# Patient Record
Sex: FEMALE | Race: WHITE | NOT HISPANIC OR LATINO | Employment: FULL TIME | URBAN - METROPOLITAN AREA
[De-identification: names, ages, dates, MRNs, and addresses within clinical notes are randomized per-mention and may not be internally consistent; named-entity substitution may affect disease eponyms.]

---
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
## 2023-02-02 ENCOUNTER — OFFICE VISIT (OUTPATIENT)
Dept: URGENT CARE | Facility: CLINIC | Age: 18
End: 2023-02-02
Payer: COMMERCIAL

## 2023-02-02 VITALS
HEIGHT: 59 IN | OXYGEN SATURATION: 100 % | RESPIRATION RATE: 18 BRPM | TEMPERATURE: 99 F | HEART RATE: 82 BPM | DIASTOLIC BLOOD PRESSURE: 70 MMHG | BODY MASS INDEX: 24.8 KG/M2 | SYSTOLIC BLOOD PRESSURE: 114 MMHG | WEIGHT: 123 LBS

## 2023-02-02 DIAGNOSIS — L03.119 CELLULITIS OF KNEE: ICD-10-CM

## 2023-02-02 DIAGNOSIS — S80.01XA CONTUSION OF RIGHT KNEE, INITIAL ENCOUNTER: ICD-10-CM

## 2023-02-02 DIAGNOSIS — S89.91XA RIGHT KNEE INJURY, INITIAL ENCOUNTER: Primary | ICD-10-CM

## 2023-02-02 DIAGNOSIS — S80.211A ABRASION OF RIGHT KNEE, INITIAL ENCOUNTER: ICD-10-CM

## 2023-02-02 PROCEDURE — 99214 OFFICE O/P EST MOD 30 MIN: CPT | Mod: ,,, | Performed by: FAMILY MEDICINE

## 2023-02-02 PROCEDURE — 3008F PR BODY MASS INDEX (BMI) DOCUMENTED: ICD-10-PCS | Mod: CPTII,,, | Performed by: FAMILY MEDICINE

## 2023-02-02 PROCEDURE — 3008F BODY MASS INDEX DOCD: CPT | Mod: CPTII,,, | Performed by: FAMILY MEDICINE

## 2023-02-02 PROCEDURE — 1160F PR REVIEW ALL MEDS BY PRESCRIBER/CLIN PHARMACIST DOCUMENTED: ICD-10-PCS | Mod: CPTII,,, | Performed by: FAMILY MEDICINE

## 2023-02-02 PROCEDURE — 3078F DIAST BP <80 MM HG: CPT | Mod: CPTII,,, | Performed by: FAMILY MEDICINE

## 2023-02-02 PROCEDURE — 1159F MED LIST DOCD IN RCRD: CPT | Mod: CPTII,,, | Performed by: FAMILY MEDICINE

## 2023-02-02 PROCEDURE — 99214 PR OFFICE/OUTPT VISIT, EST, LEVL IV, 30-39 MIN: ICD-10-PCS | Mod: ,,, | Performed by: FAMILY MEDICINE

## 2023-02-02 PROCEDURE — 1160F RVW MEDS BY RX/DR IN RCRD: CPT | Mod: CPTII,,, | Performed by: FAMILY MEDICINE

## 2023-02-02 PROCEDURE — 1159F PR MEDICATION LIST DOCUMENTED IN MEDICAL RECORD: ICD-10-PCS | Mod: CPTII,,, | Performed by: FAMILY MEDICINE

## 2023-02-02 PROCEDURE — 3074F PR MOST RECENT SYSTOLIC BLOOD PRESSURE < 130 MM HG: ICD-10-PCS | Mod: CPTII,,, | Performed by: FAMILY MEDICINE

## 2023-02-02 PROCEDURE — 3078F PR MOST RECENT DIASTOLIC BLOOD PRESSURE < 80 MM HG: ICD-10-PCS | Mod: CPTII,,, | Performed by: FAMILY MEDICINE

## 2023-02-02 PROCEDURE — 3074F SYST BP LT 130 MM HG: CPT | Mod: CPTII,,, | Performed by: FAMILY MEDICINE

## 2023-02-02 RX ORDER — CEPHALEXIN 500 MG/1
500 CAPSULE ORAL EVERY 8 HOURS
Qty: 15 CAPSULE | Refills: 0 | Status: SHIPPED | OUTPATIENT
Start: 2023-02-02 | End: 2023-02-07

## 2023-02-02 RX ORDER — IBUPROFEN 800 MG/1
800 TABLET ORAL 3 TIMES DAILY PRN
Qty: 30 TABLET | Refills: 0 | Status: SHIPPED | OUTPATIENT
Start: 2023-02-02 | End: 2023-02-12

## 2023-02-02 RX ORDER — TRAMADOL HYDROCHLORIDE 50 MG/1
50 TABLET ORAL EVERY 6 HOURS PRN
Qty: 12 TABLET | Refills: 0 | Status: SHIPPED | OUTPATIENT
Start: 2023-02-02 | End: 2023-02-05

## 2023-02-02 NOTE — PROGRESS NOTES
"Subjective:       Patient ID: Christofer Woodall is a 17 y.o. female.    Vitals:  height is 4' 11" (1.499 m) and weight is 55.8 kg (123 lb). Her temperature is 99 °F (37.2 °C). Her blood pressure is 114/70 and her pulse is 82. Her respiration is 18 and oxygen saturation is 100%.     Chief Complaint: Knee Injury    17-year-old female presents to clinic complaining of right knee pain.  Patient states she fell onto concrete and brick with her right knee.  There is a large abrasion on the knee.  Patient states there is pain with weight-bearing and ambulation.  Patient states she is able to extend the leg out but this causes her leg to shake.  Denies any fever.      Constitution: Negative.   HENT: Negative.     Cardiovascular: Negative.    Eyes: Negative.    Respiratory: Negative.     Gastrointestinal: Negative.    Genitourinary: Negative.    Musculoskeletal:  Positive for joint pain.   Skin: Negative.  Positive for erythema (there is a 3 x 3 area of eschar and crust over the right patella.  With surrounding erythema and warmth.  Tender to palpation.  Has full range of motion).   Allergic/Immunologic: Negative.    Neurological: Negative.    Hematologic/Lymphatic: Negative.      Objective:      Physical Exam   Constitutional: She is oriented to person, place, and time.  Non-toxic appearance. She does not appear ill. No distress.   HENT:   Head: Normocephalic and atraumatic.   Eyes: Conjunctivae are normal.   Pulmonary/Chest: Effort normal.   Abdominal: Normal appearance.   Neurological: She is alert and oriented to person, place, and time.   Skin: erythema (there is a 3 x 3 area of eschar and crust over the right patella.  With surrounding erythema and warmth.  Tender to palpation.  Has full range of motion)   Psychiatric: Her behavior is normal. Mood, judgment and thought content normal.   Vitals reviewed.         Previous History      Review of patient's allergies indicates:   Allergen Reactions    Adhesive Rash " "      Past Medical History:   Diagnosis Date    Anxiety     Bipolar disorder     Depression      Current Outpatient Medications   Medication Instructions    ARIPiprazole (ABILIFY) 5 mg, Oral    cephALEXin (KEFLEX) 500 mg, Oral, Every 8 hours    hydrOXYzine pamoate (VISTARIL) 25 mg, Oral, 4 times daily    ibuprofen (ADVIL,MOTRIN) 800 mg, Oral, 3 times daily PRN    traMADoL (ULTRAM) 50 mg, Oral, Every 6 hours PRN    traZODone (DESYREL) 100 mg, Oral, Nightly     History reviewed. No pertinent surgical history.  History reviewed. No pertinent family history.    Social History     Tobacco Use    Smoking status: Every Day    Smokeless tobacco: Never   Substance Use Topics    Alcohol use: Yes        Physical Exam      Vital Signs Reviewed   /70   Pulse 82   Temp 99 °F (37.2 °C)   Resp 18   Ht 4' 11" (1.499 m)   Wt 55.8 kg (123 lb)   SpO2 100%   BMI 24.84 kg/m²        Procedures    Procedures     Labs     Results for orders placed or performed in visit on 12/31/22   POCT COVID-19 Rapid Screening   Result Value Ref Range    POC Rapid COVID Negative Negative     Acceptable Yes    POCT Influenza A/B MOLECULAR   Result Value Ref Range    POC Molecular Influenza A Ag Negative Negative, Not Reported    POC Molecular Influenza B Ag Negative Negative, Not Reported     Acceptable Yes    POCT Strep A, Molecular   Result Value Ref Range    Molecular Strep A, POC Negative Negative     Acceptable Yes        Assessment:       1. Right knee injury, initial encounter    2. Contusion of right knee, initial encounter    3. Abrasion of right knee, initial encounter    4. Cellulitis of knee            Plan:     X-rays negative for fracture  Medications sent to pharmacy  It medication may cause drowsiness.  Do not drink alcohol or drive when taking it  Wash area with antibacterial soap daily  As discussed, monitor for signs of worsening infection.  This would include increasing pain, " more swelling of the knee, more redness around the knee, pus drainage from the wound, or fever.  Seek medical attention immediately if any of these happen       Right knee injury, initial encounter  -     XR KNEE 3 VIEW RIGHT; Future; Expected date: 02/02/2023  -     X-Ray Knee 1 or 2 View Right; Future; Expected date: 02/02/2023    Contusion of right knee, initial encounter    Abrasion of right knee, initial encounter    Cellulitis of knee    Other orders  -     cephALEXin (KEFLEX) 500 MG capsule; Take 1 capsule (500 mg total) by mouth every 8 (eight) hours. for 5 days  Dispense: 15 capsule; Refill: 0  -     traMADoL (ULTRAM) 50 mg tablet; Take 1 tablet (50 mg total) by mouth every 6 (six) hours as needed for Pain.  Dispense: 12 tablet; Refill: 0  -     ibuprofen (ADVIL,MOTRIN) 800 MG tablet; Take 1 tablet (800 mg total) by mouth 3 (three) times daily as needed for Pain.  Dispense: 30 tablet; Refill: 0

## 2023-02-02 NOTE — PROGRESS NOTES
Subjective:       Patient ID: Christofer Woodall is a 17 y.o. female.    Vitals:  vitals were not taken for this visit.     Chief Complaint: Knee Injury    Right Knee injury x6d fell off scooter     Knee Injury  ROS    Objective:      Physical Exam      Assessment:       No diagnosis found.      Plan:         There are no diagnoses linked to this encounter.

## 2023-02-03 ENCOUNTER — OFFICE VISIT (OUTPATIENT)
Dept: URGENT CARE | Facility: CLINIC | Age: 18
End: 2023-02-03
Payer: COMMERCIAL

## 2023-02-03 VITALS
SYSTOLIC BLOOD PRESSURE: 119 MMHG | WEIGHT: 123 LBS | DIASTOLIC BLOOD PRESSURE: 69 MMHG | HEIGHT: 59 IN | RESPIRATION RATE: 18 BRPM | OXYGEN SATURATION: 100 % | TEMPERATURE: 99 F | BODY MASS INDEX: 24.8 KG/M2 | HEART RATE: 74 BPM

## 2023-02-03 DIAGNOSIS — S80.211A ABRASION OF RIGHT KNEE, INITIAL ENCOUNTER: ICD-10-CM

## 2023-02-03 DIAGNOSIS — L03.119 CELLULITIS OF KNEE: Primary | ICD-10-CM

## 2023-02-03 PROCEDURE — 1159F MED LIST DOCD IN RCRD: CPT | Mod: CPTII,,,

## 2023-02-03 PROCEDURE — 99212 PR OFFICE/OUTPT VISIT, EST, LEVL II, 10-19 MIN: ICD-10-PCS | Mod: ,,,

## 2023-02-03 PROCEDURE — 3074F PR MOST RECENT SYSTOLIC BLOOD PRESSURE < 130 MM HG: ICD-10-PCS | Mod: CPTII,,,

## 2023-02-03 PROCEDURE — 3074F SYST BP LT 130 MM HG: CPT | Mod: CPTII,,,

## 2023-02-03 PROCEDURE — 3008F PR BODY MASS INDEX (BMI) DOCUMENTED: ICD-10-PCS | Mod: CPTII,,,

## 2023-02-03 PROCEDURE — 1160F PR REVIEW ALL MEDS BY PRESCRIBER/CLIN PHARMACIST DOCUMENTED: ICD-10-PCS | Mod: CPTII,,,

## 2023-02-03 PROCEDURE — 3078F PR MOST RECENT DIASTOLIC BLOOD PRESSURE < 80 MM HG: ICD-10-PCS | Mod: CPTII,,,

## 2023-02-03 PROCEDURE — 1159F PR MEDICATION LIST DOCUMENTED IN MEDICAL RECORD: ICD-10-PCS | Mod: CPTII,,,

## 2023-02-03 PROCEDURE — 3008F BODY MASS INDEX DOCD: CPT | Mod: CPTII,,,

## 2023-02-03 PROCEDURE — 3078F DIAST BP <80 MM HG: CPT | Mod: CPTII,,,

## 2023-02-03 PROCEDURE — 1160F RVW MEDS BY RX/DR IN RCRD: CPT | Mod: CPTII,,,

## 2023-02-03 PROCEDURE — 99212 OFFICE O/P EST SF 10 MIN: CPT | Mod: ,,,

## 2023-02-03 NOTE — PATIENT INSTRUCTIONS
X-rays negative for fracture  Medications sent to pharmacy  It medication may cause drowsiness.  Do not drink alcohol or drive when taking it  Wash area with antibacterial soap daily  As discussed, monitor for signs of worsening infection.  This would include increasing pain, more swelling of the knee, more redness around the knee, pus drainage from the wound, or fever.  Seek medical attention immediately if any of these happen.

## 2023-02-03 NOTE — PROGRESS NOTES
"Subjective:       Patient ID: Christofer Woodall is a 17 y.o. female.    Vitals:  height is 4' 11" (1.499 m) and weight is 55.8 kg (123 lb). Her temperature is 98.5 °F (36.9 °C). Her blood pressure is 119/69 and her pulse is 74. Her respiration is 18 and oxygen saturation is 100%.     Chief Complaint: Wound Check    Patient is a 17-year-old female with history of bipolar, anxiety, depression who presents to clinic complaining of drainage and pain to the wound on her right knee.  Patient was seen in clinic by Dr. Lees.  Discharge paperwork stated that if wound was leaking she was to seek medical attention.  Patient wanted to be evaluated.  Patient denies fever, body aches, chills.  She took 1 dose of Keflex last night.  She also reports having mupirocin ointment.    Wound Check    Musculoskeletal:  Positive for pain.   Skin:  Positive for wound, abrasion and erythema.     Objective:      Physical Exam   Constitutional: She is oriented to person, place, and time. She appears well-developed.   HENT:   Head: Normocephalic and atraumatic. Head is without abrasion, without contusion and without laceration.   Ears:   Right Ear: External ear normal.   Left Ear: External ear normal.   Nose: Nose normal.   Mouth/Throat: Oropharynx is clear and moist and mucous membranes are normal.   Eyes: Conjunctivae, EOM and lids are normal.   Neck: Trachea normal and phonation normal. Neck supple.   Cardiovascular: Normal rate, regular rhythm and normal heart sounds.   Pulmonary/Chest: Effort normal and breath sounds normal. No stridor. No respiratory distress.   Musculoskeletal: Normal range of motion.         General: Normal range of motion.   Neurological: She is alert and oriented to person, place, and time.   Skin: Skin is warm, dry and no rash. Capillary refill takes less than 2 seconds. Abrasions - lower ext.:  knee (right)abrasion and erythema No burn, No bruising and No ecchymosis   Psychiatric: Her speech is normal and behavior " is normal. Judgment and thought content normal.   Nursing note and vitals reviewed.    Integumentary:  As needed yesterday, there is a 3 x 3 cm abrasion to the right knee with erythema, eschar, some epithelialization present, small amount of serosanguineous drainage.  Tender to touch.  No purulence noted, no streaking.   Assessment:       1. Cellulitis of knee    2. Abrasion of right knee, initial encounter          Plan:         Cellulitis of knee    Abrasion of right knee, initial encounter       Photograph of abrasion, cellulitis and wound to right knee uploaded via [employer] under media tab.     Continue Keflex, ensure you are taking every 8 hours.  Take until complete even if symptoms improve.  Ensure your using mupirocin ointment 3 times daily.  Continue prescribed medication for pain at visit yesterday as directed.  Antibiotics will take 2 days to begin to kick in  If you notice large amounts of purulent drainage, streaking of redness up or down your leg, you will need to be further evaluated at that time.       Follow up with your Primary Care Provider within 3-5 days for a recheck.   Present to the Emergency Department for any significant change or worsening symptoms including worsening redness, swelling, purulent discharge, fever, body aches, or chills.

## 2023-02-03 NOTE — PATIENT INSTRUCTIONS
Continue Keflex, ensure you are taking every 8 hours.  Take until complete even if symptoms improve.  Ensure your using mupirocin ointment 3 times daily.  Continue prescribed medication for pain at visit yesterday as directed.  Antibiotics will take 2 days to begin to kick in  If you notice large amounts of purulent drainage, streaking of redness up or down your leg, you will need to be further evaluated at that time.       Follow up with your Primary Care Provider within 3-5 days for a recheck.   Present to the Emergency Department for any significant change or worsening symptoms including worsening redness, swelling, purulent discharge, fever, body aches, or chills.

## 2024-10-09 DIAGNOSIS — O09.899 TWO VESSEL UMBILICAL CORD IN SINGLETON PREGNANCY, ANTEPARTUM: Primary | ICD-10-CM

## 2024-10-10 ENCOUNTER — TELEPHONE (OUTPATIENT)
Dept: MATERNAL FETAL MEDICINE | Facility: CLINIC | Age: 19
End: 2024-10-10
Payer: COMMERCIAL

## 2024-10-14 DIAGNOSIS — O09.899 TWO VESSEL UMBILICAL CORD IN SINGLETON PREGNANCY, ANTEPARTUM: Primary | ICD-10-CM

## 2024-10-16 ENCOUNTER — TELEPHONE (OUTPATIENT)
Dept: MATERNAL FETAL MEDICINE | Facility: CLINIC | Age: 19
End: 2024-10-16
Payer: COMMERCIAL

## 2024-10-16 PROBLEM — O28.3 ABNORMAL PRENATAL ULTRASOUND: Status: ACTIVE | Noted: 2024-10-16

## 2024-10-16 NOTE — PROGRESS NOTES
Maternal Fetal Medicine New Consult    Subjective:     Patient ID: [id number]    Chief Complaint: mfm consult w/us      HPI: 19 y.o.  female  at 25w1d gestation with Estimated Date of Delivery:  2025. by LMP, consistent with early US. She is sent for MFM consultation for two-vessel umbilical cord on outside scan.     Patient had suspected two-vessel umbilical cord on outside ultrasound.  She used marijuana early in pregnancy, reports quit.  She has history of depression/anxiety, bipolar.  She was previously on Abilify.  She however reports that she does not think that it was really bipolar and mostly situational.  She has not been on any medication in about 2 years and is feeling well at this time, follows with Margot Kay routinely.  Denies any SI/HI.  She has elevated BMI of 33.33 on initial MFM consult visit.  Patient was accompanied by her significant other, Charly       She denies any personal or family history of aneuploidy or anomalies. She denies any exposure to high fevers, viral rashes, or alcohol in this pregnancy.  She denies any leaking fluid, vaginal bleeding, contractions, decreased fetal movement. Denies headaches, visual disturbances, or epigastric pain.       Pregnancy complications include:  Patient Active Problem List   Diagnosis    Abnormal prenatal ultrasound    History of depression    Elevated BMI affecting pregnancy in second trimester    Previous marijuana use    Two vessel umbilical cord in horton pregnancy, antepartum       Past Medical History:   Diagnosis Date    Anxiety     not currently    Bipolar disorder     currently    Depression 2018    Not currently    Generalized anxiety disorder 10/14/2022    Major depressive disorder, recurrent, severe without psychotic features 10/14/2022       Past Surgical History:   Procedure Laterality Date    LITHOTRIPSY      TONSILLECTOMY, ADENOIDECTOMY  2007       Family History   Problem Relation Name Age of Onset     "Breast cancer Paternal Grandmother      Miscarriages / Stillbirths Mother      Hypertension Mother      Eclampsia Mother         Social History     Socioeconomic History    Marital status: Single   Tobacco Use    Smoking status: Former     Types: Vaping with nicotine     Start date:      Quit date:      Years since quittin.7     Passive exposure: Current    Smokeless tobacco: Never   Substance and Sexual Activity    Alcohol use: Never    Drug use: Not Currently     Types: Marijuana    Sexual activity: Yes     Partners: Male       Current Outpatient Medications   Medication Sig Dispense Refill    PRENATAL VITAMIN PLUS LOW IRON 27 mg iron- 1 mg Tab Take 1 tablet by mouth.      aspirin (ECOTRIN) 81 MG EC tablet Take 1 tablet (81 mg total) by mouth once daily. 30 tablet 5    hydrOXYzine pamoate (VISTARIL) 25 MG Cap Take 25 mg by mouth 4 (four) times daily. (Patient not taking: Reported on 10/17/2024)      ondansetron (ZOFRAN) 4 MG tablet Take 4 mg by mouth every 6 (six) hours as needed. (Patient not taking: Reported on 10/17/2024)       No current facility-administered medications for this visit.       Review of patient's allergies indicates:   Allergen Reactions    Adhesive Rash        Review of Systems   12 point review of systems conducted, negative except as stated in the history of present illness. See HPI for details.      Objective:     Visit Vitals  /76 (BP Location: Left arm, Patient Position: Sitting)   Pulse 85   Ht 4' 11" (1.499 m)   Wt 74.8 kg (165 lb)   LMP 2024 (Exact Date)   BMI 33.33 kg/m²        Physical Exam  Vitals and nursing note reviewed.   Constitutional:       General: She is not in acute distress.     Appearance: Normal appearance.      Comments: Increased BMI   HENT:      Head: Normocephalic and atraumatic.      Nose: Nose normal. No congestion.      Mouth/Throat:      Pharynx: Oropharynx is clear.   Eyes:      General: No scleral icterus.     Conjunctiva/sclera: " Conjunctivae normal.   Cardiovascular:      Rate and Rhythm: Normal rate and regular rhythm.   Pulmonary:      Effort: No respiratory distress.      Breath sounds: Normal breath sounds. No wheezing.   Abdominal:      General: Abdomen is flat.      Palpations: Abdomen is soft.      Tenderness: There is no abdominal tenderness. There is no right CVA tenderness, left CVA tenderness or guarding.      Comments: No CVA tenderness gravid uterus.    Musculoskeletal:         General: Normal range of motion.      Cervical back: Neck supple.      Right lower leg: No edema.      Left lower leg: No edema.   Skin:     General: Skin is warm.      Findings: No bruising or rash.   Neurological:      General: No focal deficit present.      Mental Status: She is oriented to person, place, and time.      Deep Tendon Reflexes: Reflexes normal.      Comments: Normal reflexes   Psychiatric:         Mood and Affect: Mood normal.         Behavior: Behavior normal.         Thought Content: Thought content normal.         Judgment: Judgment normal.         Assessment/Plan:     19 y.o.  female with IUP at 25w1d    Two-vessel umbilical cord   There is normal fetal growth seen on 10/17/24.  Two-vessel umbilical cord seen on anatomy scan 10/17/2024.    The prevalence of two vessel cord is around 0.5-1%, with numerous maternal and fetal risk factors including multiple births, older maternal age, smoking, maternal diabetes, hypertensive disease, epilepsy, and drug exposure like vitamin A, Phenytoin and Levothyroxine, or substance abuse. I have discussed with her that two vessel cord is associated with high risk of anomalies of around 20% (up to 35%).  The most common congenital anomalies associated include genitourinary (6.5%), cardiovascular (6.2%), and musculoskeletal (5.4%). The reassurance obtained by the normal ultrasound and the absence of other anomalies were discussed, as well as the limitation of ultrasound in checking for anomalies  and the need for  evaluation especially if congenital heart disease.    With isolated single umbilical artery, there is no increased risk of aneuploidy, but there is increase risk of placental abnormalities (OR 3.63), hydramnios (OR 2.8), higher risk of prematurity, fetal growth restriction and adverse  outcomes. The risk of aneuploidy is increased with presence of other anomalies - approximate risk is 3.7% with one additional anomaly and up to 50% with multiple anomalies. However, I discussed and offered diagnostic genetic amniocentesis. She declined amniocentesis .    I discussed and counseled on Cell free DNA understanding that it is an enhanced screening test but not a diagnostic test. It assesses risk for common aneuploidies such as Trisomy 13, 18, and 21 by evaluating cell-free fetal DNA in maternal circulation with a false positive rate less than 0.5% for Trisomy 21 and less reliable for Trisomy 13 and Trisomy 18. She accepted cell free DNA and order was given.    With two-vessel cord, there is higher risk of fetal growth restriction. She would benefit from serial ultrasounds to monitor growth with ultrasound around every 4 weeks until end of pregnancy.  Recommend weekly fetal testing starting at 32 weeks. She was advised to continue fetal kick counts 3 times daily and PRN, with immediate reporting of decreased fetal movements (<10 movements/hr).      Previous marijuana use   I discussed with her the likely cognitive developmental effect of those drugs that would not be visible by the ultrasound and the importance of long term  follow up.  Marijuana use in pregnancy is associated with long term neurobehavioral adverse outcomes, as well as fetal growth restriction, stillbirth, spontaneous  birth, and  intensive care unit admission. .  I stressed the importance of complete abstinence from drug use in pregnancy. I would recommend doing intermittent drug screens to help  with compliance, with risks and benefits of drug testing discussed.      History of depression/anxiety  Discussed risks with depression and antidepressant use in pregnancy. I have shared with her the five-fold increased risk of recurrence of depression with discontinuing the medication in pregnancy. It was weighed against the risk of medication use including potential birth defects of some anti depression medications, as well as the likelihood of SSRI medications causing pulmonary hypertension when used after 20 weeks as well as  morbidity when used close to delivery (in 30% of patients). Although earlier limited data suggested association of paroxetine (Paxil) with right ventricular outflow tract obstruction and sertraline (Zoloft) with ventricular septal heart defects, a recent (2014) large population based study showed no substantial increase in the risk of cardiac malformations attributable to antidepressant use in 1st trimester. The absolute risk of other reported risks in some studies is very small: omphalocele of 1 in 5,000 births, craniosynostosis 1 in 1,800 births, and anencephaly of 1 in 1,000 births.    The potential risks of SSRI use in pregnancy must be considered in the context of the risk of relapse of depression if treatment is discontinued. In a study of pregnant women taking antidepressants before conception, a 68% relapse of depression was documented in those who discontinued medications during pregnancy compared with 25% relapse in those who continued antidepressant medications. Factors associated with relapse during pregnancy include a history of more than 5 years of depressive illness and of more than four episodes of relapse.    With no symptoms at this time, it was agreed to stay off the medication at this time with patient to report symptoms of depression recurrence. If she experiences any SI/HI tendencies, she is to report it immediately to provider/ER for prompt intervention. She  was advised to continue follow up care with her Mental Health provider.    After delivery, she should have a follow-up appointment within 1-2 weeks with both her psychiatric provider as well as an early postpartum visit for a mood check.  It would be reasonable to have a discussion about the benefits of breast feeding versus the potential risks of medication exposure in the breast milk.       Preeclampsia prophylaxis  With her risk factors for preeclampsia including  nulliparity, BMI over 30, mother with preeclampsia, and low socioeconomic status, discussed recommendations for low dose aspirin use to decrease risks for adverse outcomes, including preeclampsia, low birth weight and  delivery. Low-dose aspirin reduced the risk for preeclampsia by 15% in clinical trials and reduced the risk for  birth by 20% and FGR by 20%, and  mortality by around 20%.  After discussing risks/benefits of its use, it was agreed to start asa 81 mg daily today and continue until delivery..  Preeclampsia precautions given.    Patient was counseled on significance of two-vessel cord, association with other anomalies and increased  morbidity and potential mortality risk.  Patient was offered noninvasive invasive testing agreed to do cell free DNA.  She declined to have an amniocentesis.  We will plan to see in 4 weeks to complete anatomy scan.  Risks with increased BMI reviewed recommended healthy diet and proper weight gain recommendations reviewed.  Start patient on daily aspirin.  Advised to avoid any marijuana.  She was advised to keep her follow-up with a mental health provider    Follow up in about 4 weeks (around 2024) for MFM Followup, Repeat Ultrasound.     Future Appointments   Date Time Provider Department Center   [facility]  [facility]   2024  3:30 PM [facility]          Patient was evaluated by Marisela Flowers,  DAY, and and Dr. Kapoor.  Final assessment and recommendations as stated above were made by Dr. Kapoor.    This note was created with the assistance of [employer] voice recognition software. There may be transcription errors as a result of using this technology, however minimal. Effort has been made to ensure accuracy of transcription, but any obvious errors or omissions should be clarified with the author of the document.

## 2024-10-17 ENCOUNTER — OFFICE VISIT (OUTPATIENT)
Dept: MATERNAL FETAL MEDICINE | Facility: CLINIC | Age: 19
End: 2024-10-17
Payer: COMMERCIAL

## 2024-10-17 ENCOUNTER — PROCEDURE VISIT (OUTPATIENT)
Dept: MATERNAL FETAL MEDICINE | Facility: CLINIC | Age: 19
End: 2024-10-17
Payer: COMMERCIAL

## 2024-10-17 VITALS
WEIGHT: 165 LBS | HEIGHT: 59 IN | BODY MASS INDEX: 33.26 KG/M2 | SYSTOLIC BLOOD PRESSURE: 106 MMHG | DIASTOLIC BLOOD PRESSURE: 76 MMHG | HEART RATE: 85 BPM

## 2024-10-17 DIAGNOSIS — O28.3 ABNORMAL PRENATAL ULTRASOUND: Primary | ICD-10-CM

## 2024-10-17 DIAGNOSIS — Z86.59 HISTORY OF DEPRESSION: ICD-10-CM

## 2024-10-17 DIAGNOSIS — O99.212 OBESITY AFFECTING PREGNANCY IN SECOND TRIMESTER, UNSPECIFIED OBESITY TYPE: ICD-10-CM

## 2024-10-17 DIAGNOSIS — O09.899 TWO VESSEL UMBILICAL CORD IN SINGLETON PREGNANCY, ANTEPARTUM: ICD-10-CM

## 2024-10-17 DIAGNOSIS — O99.322 DRUG USE AFFECTING PREGNANCY IN SECOND TRIMESTER: ICD-10-CM

## 2024-10-17 PROBLEM — F41.1 GENERALIZED ANXIETY DISORDER: Status: RESOLVED | Noted: 2022-10-14 | Resolved: 2024-10-17

## 2024-10-17 PROBLEM — F33.2 MAJOR DEPRESSIVE DISORDER, RECURRENT, SEVERE WITHOUT PSYCHOTIC FEATURES: Status: RESOLVED | Noted: 2022-10-14 | Resolved: 2024-10-17

## 2024-10-17 RX ORDER — ONDANSETRON 4 MG/1
4 TABLET, FILM COATED ORAL EVERY 6 HOURS PRN
COMMUNITY
Start: 2024-09-03

## 2024-10-17 RX ORDER — ASPIRIN 81 MG/1
81 TABLET ORAL DAILY
Qty: 30 TABLET | Refills: 5 | Status: SHIPPED | OUTPATIENT
Start: 2024-10-17 | End: 2025-04-15

## 2024-10-17 RX ORDER — PRENATAL WITH FERROUS FUM AND FOLIC ACID 3080; 920; 120; 400; 22; 1.84; 3; 20; 10; 1; 12; 200; 27; 25; 2 [IU]/1; [IU]/1; MG/1; [IU]/1; MG/1; MG/1; MG/1; MG/1; MG/1; MG/1; UG/1; MG/1; MG/1; MG/1; MG/1
1 TABLET ORAL
COMMUNITY
Start: 2024-05-31

## 2024-11-04 DIAGNOSIS — O09.899 TWO VESSEL UMBILICAL CORD IN SINGLETON PREGNANCY, ANTEPARTUM: Primary | ICD-10-CM

## 2024-11-04 DIAGNOSIS — O99.322 DRUG USE AFFECTING PREGNANCY IN SECOND TRIMESTER: ICD-10-CM

## 2024-11-14 ENCOUNTER — PROCEDURE VISIT (OUTPATIENT)
Dept: MATERNAL FETAL MEDICINE | Facility: CLINIC | Age: 19
End: 2024-11-14
Payer: COMMERCIAL

## 2024-11-14 ENCOUNTER — OFFICE VISIT (OUTPATIENT)
Dept: MATERNAL FETAL MEDICINE | Facility: CLINIC | Age: 19
End: 2024-11-14
Payer: COMMERCIAL

## 2024-11-14 VITALS
HEART RATE: 95 BPM | BODY MASS INDEX: 35.08 KG/M2 | HEIGHT: 59 IN | SYSTOLIC BLOOD PRESSURE: 111 MMHG | DIASTOLIC BLOOD PRESSURE: 86 MMHG | WEIGHT: 174 LBS

## 2024-11-14 DIAGNOSIS — Z86.59 HISTORY OF DEPRESSION: ICD-10-CM

## 2024-11-14 DIAGNOSIS — O99.213 OBESITY AFFECTING PREGNANCY IN THIRD TRIMESTER, UNSPECIFIED OBESITY TYPE: ICD-10-CM

## 2024-11-14 DIAGNOSIS — O26.03 EXCESSIVE WEIGHT GAIN IN PREGNANCY IN THIRD TRIMESTER: ICD-10-CM

## 2024-11-14 DIAGNOSIS — O28.3 ABNORMAL PRENATAL ULTRASOUND: ICD-10-CM

## 2024-11-14 DIAGNOSIS — O09.899 TWO VESSEL UMBILICAL CORD IN SINGLETON PREGNANCY, ANTEPARTUM: ICD-10-CM

## 2024-11-14 DIAGNOSIS — O99.322 DRUG USE AFFECTING PREGNANCY IN SECOND TRIMESTER: Primary | ICD-10-CM

## 2024-11-14 DIAGNOSIS — O99.322 DRUG USE AFFECTING PREGNANCY IN SECOND TRIMESTER: ICD-10-CM

## 2024-11-14 DIAGNOSIS — O36.5990 PREGNANCY AFFECTED BY FETAL GROWTH RESTRICTION: ICD-10-CM

## 2024-11-14 NOTE — PROGRESS NOTES
Maternal Fetal Medicine Follow Up    Subjective:     Patient ID: [id number]    Chief Complaint: [facility] follow up with US      HPI: Christofer Woodall is a 19 y.o. female  at 29w1d gestation with Estimated Date of Delivery: 25  who is here for follow-up consultation by MFM.    She has two-vessel umbilical cord.  She declined amniocentesis and accepted cell free DNA, but has not done that yet.  She used marijuana early in pregnancy, reports quit.  She has history of depression/anxiety, bipolar; but reportedly patient feels was mostly situational and has been feeling well on no medications, following with Margot Kay routinely.  Denies any SI/HI.  She had elevated BMI of 33.33 on initial MFM consult visit.  She is on low-dose aspirin once daily for preeclampsia prophylaxis.       Interval history since last MFM visit: None.. She denies any leaking fluid, vaginal bleeding, contractions, decreased fetal movement. Denies headaches, visual disturbances, or epigastric pain.    Pregnancy complications include:   Patient Active Problem List   Diagnosis    Abnormal prenatal ultrasound    History of depression    Elevated BMI affecting pregnancy in third trimester    Previous marijuana use    Two vessel umbilical cord in horton pregnancy, antepartum    Pregnancy affected by fetal growth restriction    Excessive weight gain in pregnancy in third trimester       No changes to medical, surgical, family, social, or obstetric history.    Medications:  Current Outpatient Medications   Medication Instructions    aspirin (ECOTRIN) 81 mg, Oral, Daily    hydrOXYzine pamoate (VISTARIL) 25 mg, 4 times daily    ondansetron (ZOFRAN) 4 mg, Every 6 hours PRN    PRENATAL VITAMIN PLUS LOW IRON 27 mg iron- 1 mg Tab 1 tablet       Review of Systems   12 point review of systems conducted, negative except as stated in the history of present illness. See HPI for details.      Objective:     Visit Vitals  /86 (BP Location: Left  "arm, Patient Position: Sitting)   Pulse 95   Ht 4' 11" (1.499 m)   Wt 78.9 kg (174 lb)   LMP 2024 (Exact Date)   BMI 35.14 kg/m²        Physical Exam    Assessment/Plan:     19 y.o.  female with IUP at 29w1d    Two-vessel umbilical cord   There is borderline fetal growth restriction with an EFW of 1117 g at the 20% and lagging AC measurement at the 10% on 24  AFV is normal.  BPP  and normal umbilical artery Doppler today, 2024     She was previously offered and declined amniocentesis.  She accepted cell free DNA, has order, plans to do.  She is aware of the need for routine  evaluation.    Fetal surveillance as below.    Delivery timing as below.      Borderline fetal growth restriction   I discussed potential etiologies of FGR include but are not limited to normal variation of stature, placental insufficiency, chromosomal abnormalities, genetic disorders, infections, medical conditions, teratogen exposure and other etiologies.  We discussed the increased risk of stillbirth and the potential need for earlier delivery.The potential for constitutional factor as a contributing factor was addressed in addition to other contributing factors such as conditions predisposing to vascular disease such as pregestational diabetes, chronic renal disease, autoimmune disorders; umbilical cord abnormalities; substance use; and multiple gestation. Although uteroplacental insufficiency and/or constitutional factors (if relevant) are the likely etiology, the potential for anomalies not seen with the ultrasound, aneuploidy, or in-utero viral infections are there, but these are very unlikely to be the cause with no other ultrasound findings. Based on a study in 2018, there is an abnormal microarray in 3% of isolated FGR. I discussed and offered genetic amniocentesis, to check for microarray and CMV was offered. The benefits and risks were discussed. She declined amniocentesis . She was advised of need " for  evaluation.    She was counseled on Cell free DNA understanding that it is an enhanced screening test but not a diagnostic test. It assesses risk for common aneuploidies such as Trisomy 13, 18, and 21 by evaluating cell-free fetal DNA in maternal circulation with a false positive rate less than 0.5% for Trisomy 21 and less reliable for Trisomy 13 and Trisomy 18. She has order for cell free DNA.  She stated she will do it before her next visit.    Complications of growth-restricted neonates include hypoglycemia, hyperbilirubinemia, hypothermia, seizures, sepsis, IVH, RDS, necrotizing enterocolitis, and  asphyxia. Long-term complications include cognitive delay and adult diseases such as cardiovascular disorders and type 2 diabetes. There is an increased risk (~20%) for recurrence of fetal growth restriction in a future pregnancy.    Because of increased risk of stillbirth, she was advised that in this situation we need to monitor interval fetal growth every 3 weeks and do twice weekly fetal testing, including an NST at least once per week. She was instructed that fetal testing is not a 100% protective against the risk of fetal demise in-utero. The importance of doing fetal kick counts three times a day and resting in a lateral recumbent position and reporting immediately at any time there is any decreased fetal movement even if the same day of testing was emphasized. Her questions were answered.    Delivery is not indicated at this time, as risks of  mortality and morbidity with delivery, outweigh risks with continued pregnancy. Likewise, with stable condition, steroids and magnesium sulfate are not recommended, as benefit is reaped only if suspected imminent delivery in few days which, although possible, is very unlikely at this time. Plan delivery 38-39 weeks if continued fetal growth and reassuring fetal testing.  Earlier delivery may be needed if worsening fetal growth, abnormal  doppler, or abnormal fetal testing or other concerns.      Previous marijuana use   Previously discussed risks.  Reviewed importance of complete abstinence from drug use in pregnancy. I would recommend doing intermittent drug screens to help with compliance, with risks and benefits of drug testing discussed.      History of depression/anxiety  With no symptoms at this time, it was agreed to stay off the medication at this time with patient to report symptoms of depression recurrence. If she experiences any SI/HI tendencies, she is to report it immediately to provider/ER for prompt intervention. She was advised to continue follow up care with her Mental Health provider.    After delivery, she should have a follow-up appointment within 1-2 weeks with both her psychiatric provider as well as an early postpartum visit for a mood check.  It would be reasonable to have a discussion about the benefits of breast feeding versus the potential risks of medication exposure in the breast milk.       Preeclampsia prophylaxis  With her risk factors for preeclampsia, she will continue asa 81 mg daily until delivery. Preeclampsia precautions reviewed.     Patient was counseled of the association of two-vessel cord with fetal growth restriction.  Fetal testing is reassuring at this time.  We will plan to do twice weekly fetal testing alternating with Dr. Watts including an NST earlier in the week.  Discussed with Dr. Watts who will arrange for that starting next week.  Patient is not interested in amniocentesis that was offered but stated she will do cell free DNA and still had the order for it.  Delivery is not indicated at this time nor is steroids as it is unlikely that she will deliver within a week's time.  Fetal kick count instructions and preeclampsia warnings.  Continue daily aspirin.  It is important to avoid continued excessive weight gain discussed with the patient gaining about 10 lb in last month    Follow up in  about 1 week (around 11/21/2024) for MFM Followup, BPP/UAD.     Future Appointments   Date Time Provider Department Center   11/21/2024  3:30 PM Geovanny Kapoor MD [facility]   11/21/2024  3:30 PM ROOM 2, [facility]        KAUR involvement: Patient was evaluated and examined by Dr. Kapoor. DAY Mercer, helped in pre charting of part of note.    This note was created with the assistance of [employer] voice recognition software. There may be transcription errors as a result of using this technology, however minimal. Effort has been made to ensure accuracy of transcription, but any obvious errors or omissions should be clarified with the author of the document.

## 2024-11-15 DIAGNOSIS — O99.322 DRUG USE AFFECTING PREGNANCY IN SECOND TRIMESTER: Primary | ICD-10-CM

## 2024-11-15 DIAGNOSIS — O09.899 TWO VESSEL UMBILICAL CORD IN SINGLETON PREGNANCY, ANTEPARTUM: ICD-10-CM

## 2024-11-15 DIAGNOSIS — O36.5930: ICD-10-CM

## 2024-11-20 NOTE — PROGRESS NOTES
Maternal Fetal Medicine Follow Up    Subjective:     Patient ID: [id number]    Chief Complaint: M follwo up w/us       HPI: Christofer Woodall is a 19 y.o. female  at 30w1d gestation with Estimated Date of Delivery: 25  who is here for follow-up consultation by [facility].    She has two-vessel umbilical cord.  She declined amniocentesis and accepted cell free DNA, but has not done that yet.  She used marijuana early in pregnancy, reports quit.  She has history of depression/anxiety, bipolar; but reportedly patient feels was mostly situational and has been feeling well on no medications, following with Margot Kay routinely.  Denies any SI/HI.  She had elevated BMI of 33.33 on initial MFM consult visit.  She is on low-dose aspirin once daily for preeclampsia prophylaxis.  She had borderline fetal growth restriction noted on 2024 and is here for fetal testing today.       Interval history since last M visit: None.. She denies any leaking fluid, vaginal bleeding, contractions, decreased fetal movement. Denies headaches, visual disturbances, or epigastric pain.    Pregnancy complications include:   Patient Active Problem List   Diagnosis    Abnormal prenatal ultrasound    History of depression    Elevated BMI affecting pregnancy in third trimester    Previous marijuana use    Two vessel umbilical cord in horton pregnancy, antepartum    Pregnancy affected by fetal growth restriction    Excessive weight gain in pregnancy in third trimester       No changes to medical, surgical, family, social, or obstetric history.    Medications:  Current Outpatient Medications   Medication Instructions    aspirin (ECOTRIN) 81 mg, Oral, Daily    hydrOXYzine pamoate (VISTARIL) 25 mg, 4 times daily    ondansetron (ZOFRAN) 4 mg, Every 6 hours PRN    PRENATAL VITAMIN PLUS LOW IRON 27 mg iron- 1 mg Tab 1 tablet       Review of Systems   12 point review of systems conducted, negative except as stated in the history of  "present illness. See HPI for details.      Objective:     Visit Vitals  /79 (BP Location: Left arm, Patient Position: Sitting)   Pulse 93   Ht 4' 11" (1.499 m)   Wt 80.7 kg (178 lb)   LMP 2024 (Exact Date)   BMI 35.95 kg/m²        Physical Exam    Assessment/Plan:     19 y.o.  female with IUP at 30w1d    Two-vessel umbilical cord   There is borderline fetal growth restriction with an EFW of 1117 g at the 20% and lagging AC measurement at the 10% on 24  AFV is normal.  BPP 8/8 and normal umbilical artery Doppler today, 2024     She was previously offered and declined amniocentesis.  She accepted cell free DNA, has order, plans to do.  She is aware of the need for routine  evaluation.    Fetal surveillance as below.    Delivery timing as below.      Borderline fetal growth restriction   She declined amniocentesis . She was previously given order for cell free DNA. She was advised of need for  evaluation.    Complications of growth-restricted neonates include hypoglycemia, hyperbilirubinemia, hypothermia, seizures, sepsis, IVH, RDS, necrotizing enterocolitis, and  asphyxia. Long-term complications include cognitive delay and adult diseases such as cardiovascular disorders and type 2 diabetes. There is an increased risk (~20%) for recurrence of fetal growth restriction in a future pregnancy.    Will continue to monitor interval fetal growth every 3 weeks and continue twice weekly fetal testing, alternating with primary OB, including an NST at least once per week. She was previously instructed that fetal testing is not a 100% protective against the risk of fetal demise in-utero.  Fetal kick count instructions reviewed.    Delivery is not indicated at this time, as risks of  mortality and morbidity with delivery, outweigh risks with continued pregnancy. Likewise, with stable condition, or too early a gestational age, steroids (and magnesium sulfate) are not " recommended, as benefit is reaped if suspected imminent delivery in few days which, although possible, is very unlikely at this time. Plan delivery 38-39 weeks if continued fetal growth and reassuring fetal testing. Earlier delivery may be needed if worsening fetal growth, abnormal doppler, or abnormal fetal testing or other concerns.       Previous marijuana use   Previously discussed risks.  Reviewed importance of complete abstinence from drug use in pregnancy. I would recommend doing intermittent drug screens to help with compliance, with risks and benefits of drug testing discussed.      History of depression/anxiety  With no symptoms at this time, it was agreed to stay off the medication at this time with patient to report symptoms of depression recurrence. If she experiences any SI/HI tendencies, she is to report it immediately to provider/ER for prompt intervention. She was advised to continue follow up care with her Mental Health provider.    After delivery, she should have a follow-up appointment within 1-2 weeks with both her psychiatric provider as well as an early postpartum visit for a mood check.  It would be reasonable to have a discussion about the benefits of breast feeding versus the potential risks of medication exposure in the breast milk.       Preeclampsia prophylaxis  With her risk factors for preeclampsia, she will continue asa 81 mg daily until delivery. Preeclampsia precautions reviewed.     Fetal testing is reassuring biophysical profile 8/8 with normal umbilical artery Doppler.  Reports good fetal movement.  We will plan to rechecked again next week.    Follow up in about 1 week (around 11/28/2024) for MFM follow-up, BPP, UAD.     No future appointments.     KAUR involvement: Patient was evaluated and examined by Dr. Kapoor. BIJAL Wilcox, helped in pre charting of part of note.    This note was created with the assistance of [employer] voice recognition software. There may be transcription  errors as a result of using this technology, however minimal. Effort has been made to ensure accuracy of transcription, but any obvious errors or omissions should be clarified with the author of the document.

## 2024-11-21 ENCOUNTER — OFFICE VISIT (OUTPATIENT)
Dept: MATERNAL FETAL MEDICINE | Facility: CLINIC | Age: 19
End: 2024-11-21
Payer: COMMERCIAL

## 2024-11-21 ENCOUNTER — PROCEDURE VISIT (OUTPATIENT)
Dept: MATERNAL FETAL MEDICINE | Facility: CLINIC | Age: 19
End: 2024-11-21
Payer: COMMERCIAL

## 2024-11-21 VITALS
WEIGHT: 178 LBS | SYSTOLIC BLOOD PRESSURE: 123 MMHG | BODY MASS INDEX: 35.88 KG/M2 | DIASTOLIC BLOOD PRESSURE: 79 MMHG | HEART RATE: 93 BPM | HEIGHT: 59 IN

## 2024-11-21 DIAGNOSIS — O09.899 TWO VESSEL UMBILICAL CORD IN SINGLETON PREGNANCY, ANTEPARTUM: ICD-10-CM

## 2024-11-21 DIAGNOSIS — O36.5930: ICD-10-CM

## 2024-11-21 DIAGNOSIS — O09.899 TWO VESSEL UMBILICAL CORD IN SINGLETON PREGNANCY, ANTEPARTUM: Primary | ICD-10-CM

## 2024-11-21 DIAGNOSIS — O99.322 DRUG USE AFFECTING PREGNANCY IN SECOND TRIMESTER: ICD-10-CM

## 2024-11-21 DIAGNOSIS — O36.5990 PREGNANCY AFFECTED BY FETAL GROWTH RESTRICTION: ICD-10-CM

## 2024-11-21 DIAGNOSIS — O99.213 OBESITY AFFECTING PREGNANCY IN THIRD TRIMESTER, UNSPECIFIED OBESITY TYPE: ICD-10-CM

## 2024-11-21 DIAGNOSIS — O28.3 ABNORMAL PRENATAL ULTRASOUND: ICD-10-CM

## 2024-11-21 DIAGNOSIS — O26.03 EXCESSIVE WEIGHT GAIN IN PREGNANCY IN THIRD TRIMESTER: ICD-10-CM

## 2024-11-21 RX ORDER — ASPIRIN 81 MG/1
81 TABLET ORAL DAILY
Qty: 30 TABLET | Refills: 5 | Status: SHIPPED | OUTPATIENT
Start: 2024-11-21 | End: 2025-05-20

## 2024-11-22 ENCOUNTER — TELEPHONE (OUTPATIENT)
Dept: MATERNAL FETAL MEDICINE | Facility: CLINIC | Age: 19
End: 2024-11-22
Payer: COMMERCIAL

## 2024-11-25 DIAGNOSIS — O99.322 DRUG USE AFFECTING PREGNANCY IN SECOND TRIMESTER: Primary | ICD-10-CM

## 2024-11-25 DIAGNOSIS — O36.5930: ICD-10-CM

## 2024-11-25 DIAGNOSIS — O09.899 TWO VESSEL UMBILICAL CORD IN SINGLETON PREGNANCY, ANTEPARTUM: ICD-10-CM

## 2024-11-26 ENCOUNTER — TELEPHONE (OUTPATIENT)
Dept: MATERNAL FETAL MEDICINE | Facility: CLINIC | Age: 19
End: 2024-11-26
Payer: COMMERCIAL

## 2024-11-27 ENCOUNTER — TELEPHONE (OUTPATIENT)
Dept: MATERNAL FETAL MEDICINE | Facility: CLINIC | Age: 19
End: 2024-11-27

## 2024-12-01 ENCOUNTER — OUTSIDE PLACE OF SERVICE (OUTPATIENT)
Dept: ADMINISTRATIVE | Facility: OTHER | Age: 19
End: 2024-12-01
Payer: COMMERCIAL

## 2024-12-01 DIAGNOSIS — Z3A.31 31 WEEKS GESTATION OF PREGNANCY: ICD-10-CM

## 2024-12-01 DIAGNOSIS — O36.5990 PREGNANCY AFFECTED BY FETAL GROWTH RESTRICTION: ICD-10-CM

## 2024-12-01 DIAGNOSIS — O09.899 TWO VESSEL UMBILICAL CORD IN SINGLETON PREGNANCY, ANTEPARTUM: Primary | ICD-10-CM

## 2024-12-03 DIAGNOSIS — O99.213 OBESITY AFFECTING PREGNANCY IN THIRD TRIMESTER, UNSPECIFIED OBESITY TYPE: ICD-10-CM

## 2024-12-03 DIAGNOSIS — O09.899 TWO VESSEL UMBILICAL CORD IN SINGLETON PREGNANCY, ANTEPARTUM: ICD-10-CM

## 2024-12-03 DIAGNOSIS — O99.322 DRUG USE AFFECTING PREGNANCY IN SECOND TRIMESTER: Primary | ICD-10-CM

## 2024-12-04 ENCOUNTER — PROCEDURE VISIT (OUTPATIENT)
Dept: MATERNAL FETAL MEDICINE | Facility: CLINIC | Age: 19
End: 2024-12-04
Payer: COMMERCIAL

## 2024-12-04 ENCOUNTER — OFFICE VISIT (OUTPATIENT)
Dept: MATERNAL FETAL MEDICINE | Facility: CLINIC | Age: 19
End: 2024-12-04
Payer: COMMERCIAL

## 2024-12-04 VITALS
HEART RATE: 107 BPM | WEIGHT: 182 LBS | BODY MASS INDEX: 36.69 KG/M2 | SYSTOLIC BLOOD PRESSURE: 118 MMHG | DIASTOLIC BLOOD PRESSURE: 80 MMHG | HEIGHT: 59 IN

## 2024-12-04 DIAGNOSIS — O36.5931 LIGHT FOR GESTATIONAL DATES AFFECTING MANAGEMENT OF MOTHER, THIRD TRIMESTER, FETUS 1: ICD-10-CM

## 2024-12-04 DIAGNOSIS — O09.899 TWO VESSEL UMBILICAL CORD IN SINGLETON PREGNANCY, ANTEPARTUM: ICD-10-CM

## 2024-12-04 DIAGNOSIS — O99.213 OBESITY AFFECTING PREGNANCY IN THIRD TRIMESTER, UNSPECIFIED OBESITY TYPE: ICD-10-CM

## 2024-12-04 DIAGNOSIS — O36.5990 PREGNANCY AFFECTED BY FETAL GROWTH RESTRICTION: ICD-10-CM

## 2024-12-04 DIAGNOSIS — Z86.59 HISTORY OF DEPRESSION: Primary | ICD-10-CM

## 2024-12-04 DIAGNOSIS — O99.322 DRUG USE AFFECTING PREGNANCY IN SECOND TRIMESTER: ICD-10-CM

## 2024-12-04 PROCEDURE — 3079F DIAST BP 80-89 MM HG: CPT | Mod: CPTII,S$GLB,, | Performed by: OBSTETRICS & GYNECOLOGY

## 2024-12-04 PROCEDURE — 3008F BODY MASS INDEX DOCD: CPT | Mod: CPTII,S$GLB,, | Performed by: OBSTETRICS & GYNECOLOGY

## 2024-12-04 PROCEDURE — 76820 UMBILICAL ARTERY ECHO: CPT | Mod: S$GLB,,, | Performed by: OBSTETRICS & GYNECOLOGY

## 2024-12-04 PROCEDURE — 3074F SYST BP LT 130 MM HG: CPT | Mod: CPTII,S$GLB,, | Performed by: OBSTETRICS & GYNECOLOGY

## 2024-12-04 PROCEDURE — 1160F RVW MEDS BY RX/DR IN RCRD: CPT | Mod: CPTII,S$GLB,, | Performed by: OBSTETRICS & GYNECOLOGY

## 2024-12-04 PROCEDURE — 76819 FETAL BIOPHYS PROFIL W/O NST: CPT | Mod: S$GLB,,, | Performed by: OBSTETRICS & GYNECOLOGY

## 2024-12-04 PROCEDURE — 1159F MED LIST DOCD IN RCRD: CPT | Mod: CPTII,S$GLB,, | Performed by: OBSTETRICS & GYNECOLOGY

## 2024-12-04 PROCEDURE — 99213 OFFICE O/P EST LOW 20 MIN: CPT | Mod: 25,S$GLB,, | Performed by: OBSTETRICS & GYNECOLOGY

## 2024-12-04 PROCEDURE — 76816 OB US FOLLOW-UP PER FETUS: CPT | Mod: S$GLB,,, | Performed by: OBSTETRICS & GYNECOLOGY

## 2024-12-04 NOTE — PROGRESS NOTES
Maternal Fetal Medicine Follow Up    Subjective:     Patient ID: [id number]    Chief Complaint: [facility] follow up w/us       HPI: Christofer Woodall is a 19 y.o. female  at 32w0d gestation with Estimated Date of Delivery: 25  who is here for follow-up consultation by M.    She has two-vessel umbilical cord.  She declined amniocentesis and had negative cell free DNA.  She used marijuana early in pregnancy, reports quit.  She has history of depression/anxiety, bipolar; but reportedly patient feels was mostly situational and has been feeling well on no medications, following with Margot Kay routinely.  Denies any SI/HI.  She had elevated BMI of 33.33 on initial MFM consult visit.  She is on low-dose aspirin once daily for preeclampsia prophylaxis.  She had borderline fetal growth restriction noted on 2024. She missed her followup appointment .        Interval history since last MFM visit: None.. She denies any leaking fluid, vaginal bleeding, contractions, decreased fetal movement. Denies headaches, visual disturbances, or epigastric pain.    Pregnancy complications include:   Patient Active Problem List   Diagnosis    History of depression    Elevated BMI affecting pregnancy in third trimester    Previous marijuana use    Two vessel umbilical cord in horton pregnancy, antepartum    Pregnancy affected by fetal growth restriction    Excessive weight gain in pregnancy in third trimester    Low-normal fetal growth with the estimated fetal weight at 17 % affecting management of mother, third trimester, fetus 1       No changes to medical, surgical, family, social, or obstetric history.    Medications:  Current Outpatient Medications   Medication Instructions    aspirin (ECOTRIN) 81 mg, Oral, Daily    ondansetron (ZOFRAN) 4 mg, Every 6 hours PRN    PRENATAL VITAMIN PLUS LOW IRON 27 mg iron- 1 mg Tab 1 tablet       Review of Systems   12 point review of systems conducted, negative except as stated  "in the history of present illness. See HPI for details.      Objective:     Visit Vitals  /80 (BP Location: Left arm, Patient Position: Sitting)   Pulse 107   Ht 4' 11" (1.499 m)   Wt 82.6 kg (182 lb)   LMP 2024 (Exact Date)   BMI 36.76 kg/m²        Physical Exam  Vitals and nursing note reviewed.   Constitutional:       Appearance: Normal appearance.      Comments: Increased BMI   HENT:      Head: Normocephalic and atraumatic.      Nose: Nose normal. No congestion.   Cardiovascular:      Rate and Rhythm: Normal rate.   Pulmonary:      Effort: Pulmonary effort is normal.   Skin:     Findings: No rash.   Neurological:      Mental Status: She is alert and oriented to person, place, and time.   Psychiatric:         Mood and Affect: Mood normal.         Behavior: Behavior normal.         Thought Content: Thought content normal.         Judgment: Judgment normal.         Assessment/Plan:     19 y.o.  female with IUP at 32w0d    Two-vessel umbilical cord   There is low normal and continued fetal growth with an EFW of 1669 g at the 17% and the AC at the 23%  on 24  AFV is normal.  BPP 8/8 and normal umbilical artery Doppler today, 2024     She was previously offered and declined amniocentesis.  She had negative cell free DNA.  She is aware of the need for routine  evaluation.    Fetal surveillance as below.    Delivery timing as below.      Low-normal fetal growth  Fetal growth appears in a low-normal range at this time.  Out of caution we will repeat the UAD in 2 weeks.    She declined amniocentesis . She had negative cell free DNA. She is aware of need for  evaluation.    Complications of growth-restricted neonates include hypoglycemia, hyperbilirubinemia, hypothermia, seizures, sepsis, IVH, RDS, necrotizing enterocolitis, and  asphyxia. Long-term complications include cognitive delay and adult diseases such as cardiovascular disorders and type 2 diabetes. There is an " increased risk (~20%) for recurrence of fetal growth restriction in a future pregnancy.    Will continue to monitor interval fetal growth every 3 weeks and continue twice weekly fetal testing, alternating with primary OB, including an NST at least once per week.  Reviewed that fetal testing is not a 100% protective against the risk of fetal demise in-utero.  Fetal kick count instructions reviewed.    Delivery is not indicated at this time, as risks of  mortality and morbidity with delivery, outweigh risks with continued pregnancy. Likewise, with stable condition, steroids and magnesium sulfate are not recommended, as benefit is reaped if suspected imminent delivery in few days which, although possible, is very unlikely at this time.     We will continue ongoing fetal assessment to decide on optimal timing of delivery.        Previous marijuana use   Previously discussed risks.  Reviewed importance of complete abstinence from drug use in pregnancy. I would recommend doing intermittent drug screens to help with compliance, with risks and benefits of drug testing discussed.      History of depression/anxiety  With no symptoms at this time, it was agreed to stay off the medication at this time with patient to report symptoms of depression recurrence. If she experiences any SI/HI tendencies, she is to report it immediately to provider/ER for prompt intervention. She was advised to continue follow up care with her Mental Health provider.    After delivery, she should have a follow-up appointment within 1-2 weeks with both her psychiatric provider as well as an early postpartum visit for a mood check.  It would be reasonable to have a discussion about the benefits of breast feeding versus the potential risks of medication exposure in the breast milk.       Preeclampsia prophylaxis  With her risk factors for preeclampsia, she will continue asa 81 mg daily until delivery. Preeclampsia precautions reviewed.     Fetal  growth continues in a low-normal range.  Biophysical profile is reassuring.  Doppler is normal.  We will plan to continue twice weekly fetal testing.  No need for Doppler next week we will rechecked again in 2 weeks        Follow up in about 1 week (around 12/11/2024) for MFM follow-up, BPP, Thursday.     Future Appointments   Date Time Provider Department Center   12/12/2024  2:15 PM ROOM 3, [facility]   12/12/2024  2:30 PM Geovanny Kapoor MD [facility]        KAUR involvement: Patient was evaluated and examined by Dr. Kapoor. DAY Mercer, helped in pre charting of part of note.    This note was created with the assistance of [employer] voice recognition software. There may be transcription errors as a result of using this technology, however minimal. Effort has been made to ensure accuracy of transcription, but any obvious errors or omissions should be clarified with the author of the document.

## 2024-12-09 DIAGNOSIS — O09.899 TWO VESSEL UMBILICAL CORD IN SINGLETON PREGNANCY, ANTEPARTUM: Primary | ICD-10-CM

## 2024-12-11 NOTE — PROGRESS NOTES
Maternal Fetal Medicine Follow Up    Subjective:     Patient ID: [id number]    Chief Complaint: m followup w/us      HPI: Christofer Woodall is a 19 y.o. female  at 33w1d gestation with Estimated Date of Delivery: 25  who is here for follow-up consultation by M.    She has two-vessel umbilical cord.  She declined amniocentesis and had negative cell free DNA.  She used marijuana early in pregnancy, reports quit.  She has history of depression/anxiety, bipolar; but reportedly patient feels was mostly situational and has been feeling well on no medications, following with Margot Kay routinely.  Denies any SI/HI.  She had elevated BMI of 33.33 on initial MFM consult visit.  She is on low-dose aspirin once daily for preeclampsia prophylaxis.  She had borderline fetal growth restriction noted on 2024. She missed her followup appointment .  The fetal growth was noted to be low normal on 2024.       Interval history since last MFM visit: None.. She denies any leaking fluid, vaginal bleeding, contractions, decreased fetal movement. Denies headaches, visual disturbances, or epigastric pain.    Pregnancy complications include:   Patient Active Problem List   Diagnosis    History of depression    Elevated BMI affecting pregnancy in third trimester    Previous marijuana use    Two vessel umbilical cord in horton pregnancy, antepartum    Pregnancy affected by fetal growth restriction    Excessive weight gain in pregnancy in third trimester    Low-normal fetal growth with the estimated fetal weight at 17 % affecting management of mother, third trimester, fetus 1       No changes to medical, surgical, family, social, or obstetric history.    Medications:  Current Outpatient Medications   Medication Instructions    aspirin (ECOTRIN) 81 mg, Oral, Daily    ondansetron (ZOFRAN) 4 mg, Every 6 hours PRN    PRENATAL VITAMIN PLUS LOW IRON 27 mg iron- 1 mg Tab 1 tablet       Review of Systems   12  "point review of systems conducted, negative except as stated in the history of present illness. See HPI for details.      Objective:     Visit Vitals  /78 (BP Location: Left arm, Patient Position: Sitting)   Ht 4' 11" (1.499 m)   Wt 84.5 kg (186 lb 3.2 oz)   LMP 2024 (Exact Date)   BMI 37.61 kg/m²        Physical Exam  Vitals and nursing note reviewed.   Constitutional:       Appearance: Normal appearance.      Comments: Increased BMI   HENT:      Head: Normocephalic and atraumatic.      Nose: Nose normal. No congestion.   Cardiovascular:      Rate and Rhythm: Normal rate.   Pulmonary:      Effort: Pulmonary effort is normal.   Skin:     Findings: No rash.   Neurological:      Mental Status: She is alert and oriented to person, place, and time.   Psychiatric:         Mood and Affect: Mood normal.         Behavior: Behavior normal.         Thought Content: Thought content normal.         Judgment: Judgment normal.         Assessment/Plan:     19 y.o.  female with IUP at 33w1d    Two-vessel umbilical cord   There was low normal and continued fetal growth with an EFW of 1669 g at the 17% and the AC at the 23%  on 24  AFV is normal.  BPP 8/8 today, 2024     She was previously offered and declined amniocentesis.  She had negative cell free DNA.  She is aware of the need for routine  evaluation.    Fetal surveillance as below.    Delivery timing as below.      Low-normal fetal growth  Fetal growth appeared to be in a low-normal range 24.  Out of caution we will repeat the UAD next week.      She declined amniocentesis . She had negative cell free DNA. She is aware of need for  evaluation.    With low normal fetal growth and two-vessel umbilical cord, Will continue to monitor interval fetal growth every 3 weeks and recommend twice weekly fetal testing, alternating with primary OB, including an NST at least once per week.  Reviewed that fetal testing is not a 100% protective " against the risk of fetal demise in-utero.  Fetal kick count instructions reviewed.    Delivery is not indicated at this time, as risks of  mortality and morbidity with delivery, outweigh risks with continued pregnancy. Likewise, with stable condition, steroids are not recommended, as benefit is reaped if suspected imminent delivery in few days which, although possible, is very unlikely at this time.   Optimal timing of delivery will depend on follow-up maternal and fetal evaluation.      Previous marijuana use   Previously discussed risks.  Reviewed importance of complete abstinence from drug use in pregnancy. I would recommend doing intermittent drug screens to help with compliance, with risks and benefits of drug testing discussed.      History of depression/anxiety  With no symptoms at this time, it was agreed to stay off the medication at this time with patient to report symptoms of depression recurrence. If she experiences any SI/HI tendencies, she is to report it immediately to provider/ER for prompt intervention. She was advised to continue follow up care with her Mental Health provider.    After delivery, she should have a follow-up appointment within 1-2 weeks with both her psychiatric provider as well as an early postpartum visit for a mood check.  It would be reasonable to have a discussion about the benefits of breast feeding versus the potential risks of medication exposure in the breast milk.       Preeclampsia prophylaxis  With her risk factors for preeclampsia, she will continue asa 81 mg daily until delivery. Preeclampsia precautions reviewed.     Increased BMI at 33 at initial MFM consult with excessive weight gain    Body mass index is 37.61 kg/m². With excessive weight gain from last visit, 12 lbs in 1 month , she was advised to decrease caloric intake and was reminded of the importance of avoiding excessive weight gain. Excess weight gain would be associated with gestational  hypertension, gestational diabetes and adverse  outcomes, including fetal demise in utero.    It is important to lose weight after the pregnancy is over, especially before a future pregnancy was discussed. Breastfeeding may be an important tool in reducing the postpartum weight retention. Fetal risks were discussed with short term risk of fetal/ obesity and long term risk of adolescent component of metabolic syndrome.    It is important to follow healthy diet and proper weight gain recommendations.  Fetal testing is reassuring.  We will plan to check again next week.        Follow up in about 1 week (around 2024) for MFM Followup, BPP/UAD.     Future Appointments   Date Time Provider Department Center   2024  1:15 PM Geovanny Kapoor MD [facility]   2024  1:15 PM ROOM 1, [facility]          KAUR involvement: Patient was evaluated and examined by Dr. Kapoor. DAY Mercer, helped in pre charting of part of note.    This note was created with the assistance of [email or web address] voice recognition software. There may be transcription errors as a result of using this technology, however minimal. Effort has been made to ensure accuracy of transcription, but any obvious errors or omissions should be clarified with the author of the document.

## 2024-12-12 ENCOUNTER — OFFICE VISIT (OUTPATIENT)
Dept: MATERNAL FETAL MEDICINE | Facility: CLINIC | Age: 19
End: 2024-12-12
Payer: COMMERCIAL

## 2024-12-12 ENCOUNTER — PROCEDURE VISIT (OUTPATIENT)
Dept: MATERNAL FETAL MEDICINE | Facility: CLINIC | Age: 19
End: 2024-12-12
Payer: COMMERCIAL

## 2024-12-12 VITALS
DIASTOLIC BLOOD PRESSURE: 78 MMHG | SYSTOLIC BLOOD PRESSURE: 117 MMHG | WEIGHT: 186.19 LBS | HEIGHT: 59 IN | BODY MASS INDEX: 37.53 KG/M2

## 2024-12-12 DIAGNOSIS — Z86.59 HISTORY OF DEPRESSION: ICD-10-CM

## 2024-12-12 DIAGNOSIS — O99.213 OBESITY AFFECTING PREGNANCY IN THIRD TRIMESTER, UNSPECIFIED OBESITY TYPE: ICD-10-CM

## 2024-12-12 DIAGNOSIS — O36.5931 LIGHT FOR GESTATIONAL DATES AFFECTING MANAGEMENT OF MOTHER, THIRD TRIMESTER, FETUS 1: ICD-10-CM

## 2024-12-12 DIAGNOSIS — O99.322 DRUG USE AFFECTING PREGNANCY IN SECOND TRIMESTER: Primary | ICD-10-CM

## 2024-12-12 DIAGNOSIS — O09.899 TWO VESSEL UMBILICAL CORD IN SINGLETON PREGNANCY, ANTEPARTUM: ICD-10-CM

## 2024-12-12 DIAGNOSIS — O36.5990 PREGNANCY AFFECTED BY FETAL GROWTH RESTRICTION: ICD-10-CM

## 2024-12-18 PROBLEM — O36.5930 LIGHT FOR DATES AFFECTING MANAGEMENT OF MOTHER, THIRD TRIMESTER, NOT APPLICABLE OR UNSPECIFIED FETUS: Status: ACTIVE | Noted: 2024-12-18

## 2024-12-18 NOTE — PROGRESS NOTES
Maternal Fetal Medicine Follow Up    Subjective:     Patient ID: [id number]    Chief Complaint: [facility] follow up with US      HPI: Christofer Woodall is a 19 y.o. female  at 34w1d gestation with Estimated Date of Delivery: 25  who is here for follow-up consultation by M.    She has two-vessel umbilical cord.  She declined amniocentesis and had negative cell free DNA.  She used marijuana early in pregnancy, reports quit.  She has history of depression/anxiety, bipolar; but reportedly patient feels was mostly situational and has been feeling well on no medications, following with Margot Kay routinely.  Denies any SI/HI.  She had elevated BMI of 33.33 on initial MFM consult visit.  She is on low-dose aspirin once daily for preeclampsia prophylaxis.  The fetal growth was noted to be low normal on 2024.       Interval history since last MFM visit: None.. She denies any leaking fluid, vaginal bleeding, contractions, decreased fetal movement. Denies headaches, visual disturbances, or epigastric pain.    Pregnancy complications include:   Patient Active Problem List   Diagnosis    History of depression    Elevated BMI affecting pregnancy in third trimester    Previous marijuana use    Two vessel umbilical cord in horton pregnancy, antepartum    Excessive weight gain in pregnancy in third trimester    Light for dates affecting management of mother, third trimester, not applicable or unspecified fetus    Elevated blood pressure affecting pregnancy, antepartum       No changes to medical, surgical, family, social, or obstetric history.    Medications:  Current Outpatient Medications   Medication Instructions    aspirin (ECOTRIN) 81 mg, Oral, Daily    ondansetron (ZOFRAN) 4 mg, Every 6 hours PRN    PRENATAL VITAMIN PLUS LOW IRON 27 mg iron- 1 mg Tab 1 tablet       Review of Systems   12 point review of systems conducted, negative except as stated in the history of present illness. See HPI for  "details.      Objective:     Visit Vitals  BP (!) 126/90   Pulse 98   Ht 4' 11" (1.499 m)   Wt 85.7 kg (189 lb)   LMP 2024 (Exact Date)   BMI 38.17 kg/m²        Physical Exam  Vitals and nursing note reviewed.   Constitutional:       Appearance: Normal appearance.   HENT:      Head: Normocephalic and atraumatic.   Cardiovascular:      Rate and Rhythm: Normal rate and regular rhythm.   Pulmonary:      Effort: Pulmonary effort is normal. No respiratory distress.      Breath sounds: Normal breath sounds.   Abdominal:      Palpations: Abdomen is soft.      Tenderness: There is no abdominal tenderness.   Musculoskeletal:      Right lower leg: Edema (tr) present.      Left lower leg: Edema (tr) present.   Skin:     General: Skin is warm and dry.   Neurological:      Mental Status: She is alert and oriented to person, place, and time.      Deep Tendon Reflexes: Reflexes normal.   Psychiatric:         Mood and Affect: Mood normal.         Behavior: Behavior normal.         Thought Content: Thought content normal.         Judgment: Judgment normal.         Assessment/Plan:     19 y.o.  female with IUP at 34w1d    Two-vessel umbilical cord   There was low normal and continued fetal growth with an EFW of 1669 g at the 17% and the AC at the 23%  on 24  AFV is normal.  BPP 8/8 today, 2024     She was previously offered and declined amniocentesis.  She had negative cell free DNA.  She is aware of the need for routine  evaluation.    Fetal surveillance as below.    Delivery timing as below.      Low-normal fetal growth  Fetal growth appeared to be in a low-normal range 24.  Out of caution, umbilical artery doppler was repeated today 24 and normal.      She declined amniocentesis . She had negative cell free DNA. She is aware of need for  evaluation.    With low normal fetal growth and two-vessel umbilical cord, Will continue to monitor interval fetal growth every 3 weeks and " recommend twice weekly fetal testing, alternating with primary OB, including an NST at least once per week.  Reviewed that fetal testing is not a 100% protective against the risk of fetal demise in-utero.  Fetal kick count instructions reviewed.    Delivery is not indicated at this time, as risks of  mortality and morbidity with delivery, outweigh risks with continued pregnancy. Optimal timing of delivery will depend on follow-up maternal and fetal evaluation.      Previous marijuana use   Previously discussed risks.  Reviewed importance of complete abstinence from drug use in pregnancy. I would recommend doing intermittent drug screens to help with compliance, with risks and benefits of drug testing discussed.      History of depression/anxiety  With no symptoms at this time, it was agreed to stay off medication at this time with patient to report symptoms of depression recurrence. If she experiences any SI/HI tendencies, she is to report it immediately to provider/ER for prompt intervention. She was advised to continue follow up care with her Mental Health provider.    After delivery, she should have a follow-up appointment within 1-2 weeks with both her psychiatric provider as well as an early postpartum visit for a mood check.  It would be reasonable to have a discussion about the benefits of breast feeding versus the potential risks of medication exposure in the breast milk.       Preeclampsia prophylaxis  With her risk factors for preeclampsia, she will continue asa 81 mg daily until delivery. Preeclampsia precautions reviewed.       Increased BMI at 33 at initial MFM consult with excessive weight gain  Body mass index is 38.17 kg/m². With continued excessive weight gain from last visit, 3 lbs in 1 week , she was advised to decrease caloric intake and was reminded of the importance of avoiding excessive weight gain.  Excess weight gain would be associated with gestational hypertension, gestational  diabetes and adverse  outcomes, including fetal demise in utero.    Reviewed importance of FKC 3/day and prn with instructions to immediately report any decreased fetal movement.    It is important to lose weight after the pregnancy is over, especially before a future pregnancy. Breastfeeding may be an important tool in reducing the postpartum weight retention. Fetal risks were discussed with short term risk of fetal/ obesity and long term risk of adolescent component of metabolic syndrome.    Elevated BP  Vitals:    24 1413 24 1416 24 1449   BP: (!) 118/92 (!) 124/98 (!) 126/90   Patient is asymptomatic.  Advised her that if blood pressure is persistently elevated it maybe concerning for development of gestational hypertension/preeclampsia.    Advised to go to hospital now for BP monitoring and labs. If normal BP's and labs, may be able to d/c to home for continued outpatient management. If confirmed gestational HTN or any lab abnormalities, proceed with course of steroids.     Preeclampsia warnings were given with instructions to come in immediately if she has any headaches, vision problems, epigastric pain, or decreased fetal movement at any time.      Patient has no headaches no vision problems no epigastric pain.  Blood pressure is elevated.  This is concerning for the possibility of evolving preeclampsia.  Patient will be sent to the hospital to have blood pressure checks and preeclampsia labs.  If blood pressures are elevated and the patient will stay overnight have a 24 hour urine get a course of steroids and reassess tomorrow.  If no evidence of severe features patient maybe discharged home to keep her follow-up Monday with Dr. Watts and see us next Thursday in the office.  Fetal kick count and preeclampsia warnings.  If evidence of severe features at any time then proceed with the immediate delivery.  Discussed with Dr. Watts who agrees and we will follow the  patient in the hospital.        Follow up for Keep return appointment make sure repeat next visit.     Future Appointments   Date Time Provider Department Center   12/26/2024  1:15 PM Geovanny Kapoor MD [facility]   12/26/2024  1:15 PM ROOM 3, [facility]          KAUR involvement: Patient was evaluated by Marisela Flowers, DAY, and and Dr. Kapoor.  Final assessment and recommendations as stated above were made by Dr. Kapoor.    This note was created with the assistance of [employer] voice recognition software. There may be transcription errors as a result of using this technology, however minimal. Effort has been made to ensure accuracy of transcription, but any obvious errors or omissions should be clarified with the author of the document.

## 2024-12-19 ENCOUNTER — PROCEDURE VISIT (OUTPATIENT)
Dept: MATERNAL FETAL MEDICINE | Facility: CLINIC | Age: 19
End: 2024-12-19
Payer: COMMERCIAL

## 2024-12-19 ENCOUNTER — OFFICE VISIT (OUTPATIENT)
Dept: MATERNAL FETAL MEDICINE | Facility: CLINIC | Age: 19
End: 2024-12-19
Payer: COMMERCIAL

## 2024-12-19 VITALS
BODY MASS INDEX: 38.1 KG/M2 | WEIGHT: 189 LBS | DIASTOLIC BLOOD PRESSURE: 90 MMHG | HEART RATE: 98 BPM | SYSTOLIC BLOOD PRESSURE: 126 MMHG | HEIGHT: 59 IN

## 2024-12-19 DIAGNOSIS — O16.9 ELEVATED BLOOD PRESSURE AFFECTING PREGNANCY, ANTEPARTUM: ICD-10-CM

## 2024-12-19 DIAGNOSIS — O36.5930: ICD-10-CM

## 2024-12-19 DIAGNOSIS — O36.5990 PREGNANCY AFFECTED BY FETAL GROWTH RESTRICTION: ICD-10-CM

## 2024-12-19 DIAGNOSIS — O36.5930 LIGHT FOR DATES AFFECTING MANAGEMENT OF MOTHER, THIRD TRIMESTER, NOT APPLICABLE OR UNSPECIFIED FETUS: ICD-10-CM

## 2024-12-19 DIAGNOSIS — Z86.59 HISTORY OF DEPRESSION: Primary | ICD-10-CM

## 2024-12-19 DIAGNOSIS — O99.322 DRUG USE AFFECTING PREGNANCY IN SECOND TRIMESTER: ICD-10-CM

## 2024-12-19 DIAGNOSIS — O09.899 TWO VESSEL UMBILICAL CORD IN SINGLETON PREGNANCY, ANTEPARTUM: ICD-10-CM

## 2024-12-19 DIAGNOSIS — O99.213 OBESITY AFFECTING PREGNANCY IN THIRD TRIMESTER, UNSPECIFIED OBESITY TYPE: ICD-10-CM

## 2024-12-23 ENCOUNTER — OUTSIDE PLACE OF SERVICE (OUTPATIENT)
Dept: ADMINISTRATIVE | Facility: OTHER | Age: 19
End: 2024-12-23
Payer: COMMERCIAL

## 2024-12-23 DIAGNOSIS — O99.322 DRUG USE AFFECTING PREGNANCY IN SECOND TRIMESTER: Primary | ICD-10-CM

## 2024-12-23 DIAGNOSIS — O36.5990 PREGNANCY AFFECTED BY FETAL GROWTH RESTRICTION: ICD-10-CM

## 2024-12-23 DIAGNOSIS — O09.899 TWO VESSEL UMBILICAL CORD IN SINGLETON PREGNANCY, ANTEPARTUM: ICD-10-CM

## 2024-12-23 DIAGNOSIS — Z3A.35 35 WEEKS GESTATION OF PREGNANCY: ICD-10-CM

## 2024-12-23 DIAGNOSIS — O09.899 TWO VESSEL UMBILICAL CORD IN SINGLETON PREGNANCY, ANTEPARTUM: Primary | ICD-10-CM

## 2024-12-23 DIAGNOSIS — O36.5930: ICD-10-CM

## 2024-12-23 DIAGNOSIS — O36.5931 LIGHT FOR GESTATIONAL DATES AFFECTING MANAGEMENT OF MOTHER, THIRD TRIMESTER, FETUS 1: ICD-10-CM

## 2024-12-23 DIAGNOSIS — O13.3 GESTATIONAL HYPERTENSION, THIRD TRIMESTER: Primary | ICD-10-CM

## 2024-12-23 DIAGNOSIS — Z3A.34 34 WEEKS GESTATION OF PREGNANCY: ICD-10-CM

## 2024-12-23 DIAGNOSIS — O99.213 OBESITY AFFECTING PREGNANCY IN THIRD TRIMESTER, UNSPECIFIED OBESITY TYPE: ICD-10-CM

## 2024-12-23 PROCEDURE — 76819 FETAL BIOPHYS PROFIL W/O NST: CPT | Mod: 26,S$GLB,, | Performed by: OBSTETRICS & GYNECOLOGY

## 2024-12-23 NOTE — PROGRESS NOTES
Maternal Fetal Medicine Follow Up    Subjective:     Patient ID: [id number]    Chief Complaint: [facility] follow up w/us       HPI: Christofer Woodall is a 19 y.o. female  at 35w1d gestation with Estimated Date of Delivery: 25  who is here for follow-up consultation by M.    She has two-vessel umbilical cord.  She declined amniocentesis and had negative cell free DNA.  She used marijuana early in pregnancy, reports quit.  She has history of depression/anxiety, bipolar; but reportedly patient feels was mostly situational and has been feeling well on no medications, following with Margot Kay routinely.  Denies any SI/HI.  She had elevated BMI of 33.33 on initial MFM consult visit.  She is on low-dose aspirin once daily for preeclampsia prophylaxis.  The fetal growth was noted to be low normal on 2024.  She had elevated blood pressure on 2024.  She was evaluated in the hospital and had normal blood pressures.  She had labs as follows P/C ratio 0.18, uric acid 5.7, , serum creatinine 0.63, AST 14, ALT 10, platelets 184 K on 2024.       Interval history since last MFM visit: None.. She denies any leaking fluid, vaginal bleeding, contractions, decreased fetal movement. Denies headaches, visual disturbances, or epigastric pain.    Pregnancy complications include:   Patient Active Problem List   Diagnosis    History of depression    Elevated BMI affecting pregnancy in third trimester    Previous marijuana use    Two vessel umbilical cord in horton pregnancy, antepartum    Excessive weight gain in pregnancy in third trimester    Gestational hypertension, third trimester    Elevated blood pressure affecting pregnancy, antepartum       No changes to medical, surgical, family, social, or obstetric history.    Medications:  Current Outpatient Medications   Medication Instructions    aspirin (ECOTRIN) 81 mg, Oral, Daily    ondansetron (ZOFRAN) 4 mg, Every 6 hours PRN    PRENATAL VITAMIN  "PLUS LOW IRON 27 mg iron- 1 mg Tab 1 tablet       Review of Systems   12 point review of systems conducted, negative except as stated in the history of present illness. See HPI for details.      Objective:     Visit Vitals  BP (!) 129/90 (BP Location: Left arm, Patient Position: Sitting)   Pulse 100   Ht 4' 11" (1.499 m)   Wt 86.6 kg (191 lb)   LMP 2024 (Exact Date)   BMI 38.58 kg/m²        Physical Exam  Vitals and nursing note reviewed.   Constitutional:       Appearance: Normal appearance.   HENT:      Head: Normocephalic and atraumatic.   Cardiovascular:      Rate and Rhythm: Normal rate and regular rhythm.   Pulmonary:      Effort: Pulmonary effort is normal. No respiratory distress.      Breath sounds: Normal breath sounds.   Abdominal:      Palpations: Abdomen is soft.      Tenderness: There is no abdominal tenderness.   Musculoskeletal:      Right lower leg: Edema (tr) present.      Left lower leg: Edema (tr) present.   Skin:     General: Skin is warm and dry.   Neurological:      Mental Status: She is alert and oriented to person, place, and time.      Deep Tendon Reflexes: Reflexes normal.   Psychiatric:         Mood and Affect: Mood normal.         Behavior: Behavior normal.         Thought Content: Thought content normal.         Judgment: Judgment normal.         Assessment/Plan:     19 y.o.  female with IUP at 35w1d    Two-vessel umbilical cord   There is low normal and continued fetal growth with an EFW of 2306 g at the 15% and the AC at the 24% on 24  AFV is normal.  BPP 8/8 today, 2024.  Normal UAD today 2024.    She was previously offered and declined amniocentesis.  She had negative cell free DNA.  She is aware of the need for routine  evaluation.    Fetal surveillance as below.    Delivery timing as below.      Low-normal fetal growth  Fetal growth still in a low-normal range today 24.  Out of caution, umbilical artery doppler was repeated today 24 " and normal.      She declined amniocentesis . She had negative cell free DNA. She is aware of need for  evaluation.    *** With low normal fetal growth and two-vessel umbilical cord, recommend twice weekly fetal testing, alternating with primary OB, including an NST at least once per week.  Reviewed that fetal testing is not a 100% protective against the risk of fetal demise in-utero.  Fetal kick count instructions reviewed.    Delivery is not indicated at this time, as risks of  mortality and morbidity with delivery, outweigh risks with continued pregnancy. Optimal timing of delivery will depend on follow-up maternal and fetal evaluation.      Previous marijuana use   Previously discussed risks.  Reviewed importance of complete abstinence from drug use in pregnancy. I would recommend doing intermittent drug screens to help with compliance, with risks and benefits of drug testing discussed.      History of depression/anxiety  With no symptoms at this time, it was agreed to stay off medication at this time with patient to report symptoms of depression recurrence. If she experiences any SI/HI tendencies, she is to report it immediately to provider/ER for prompt intervention. She was advised to continue follow up care with her Mental Health provider.    After delivery, she should have a follow-up appointment within 1-2 weeks with both her psychiatric provider as well as an early postpartum visit for a mood check.  It would be reasonable to have a discussion about the benefits of breast feeding versus the potential risks of medication exposure in the breast milk.       Preeclampsia prophylaxis  With her risk factors for preeclampsia, she will continue asa 81 mg daily until delivery. Preeclampsia precautions reviewed.       Increased BMI at 33 at initial M consult with excessive weight gain  Body mass index is 38.58 kg/m². With  2 lb gain since last visit, she was advised to continue healthy and low  caloric diet.  Excess weight gain would be associated with gestational hypertension, gestational diabetes and adverse  outcomes, including fetal demise in utero.    Reviewed importance of FKC 3/day and prn with instructions to immediately report any decreased fetal movement.    It is important to lose weight after the pregnancy is over, especially before a future pregnancy. Breastfeeding may be an important tool in reducing the postpartum weight retention. Fetal risks were discussed with short term risk of fetal/ obesity and long term risk of adolescent component of metabolic syndrome.      Gestational hypertension  Discussed risks with hypertension in pregnancy, including FGR, abruption and preeclampsia/eclampsia. Advised of low sodium diet avoiding any excessive weight gain. Ordered 24 hour urine, CBC, uric acid, LDH and LFT.     Vitals:    24 1301   BP: (!) 129/90     She is asymptomatic.    Discussed with the patient need for twice weekly follow-up with at least weekly labs, with more frequent labs if worsening blood pressure or clinical condition.  Patient has a blood pressure machine at home and will be checking BP 2-3 times a day.  She was given instructions to come in immediately if she has decreased fetal movements, headaches, vision problems, or epigastric pain.  She is also to come in if blood pressure is at or over 160 systolic or 105 diastolic.      On 24, preeclampsia labs were reassuring. We will do weekly preeclampsia labs and twice weekly fetal testing until delivery. ***steroids recommendation.    With mild gestational hypertension with no evidence of severe features, delivery is recommended at 37 weeks gestation (37 - 37 6/7 weeks) as risks of prematurity are outweighed by risks of continued pregnancy.  Earlier delivery maybe needed for worsening hypertension or severe preeclampsia. Preeclampsia precautions given.     Recommend close postpartum followup with Primary OB  for close monitoring of the BP.        Follow up for Keep return appointment.     Future Appointments   Date Time Provider Department Center   1/2/2025  2:45 PM Geovanny Kapoor MD [facility]   1/2/2025  2:45 PM ROOM 2, [facility]        KAUR involvement: Patient was evaluated and examined by Dr. Kapoor. DAY Mercer, helped in pre charting of part of note.    This note was created with the assistance of [employer] voice recognition software. There may be transcription errors as a result of using this technology, however minimal. Effort has been made to ensure accuracy of transcription, but any obvious errors or omissions should be clarified with the author of the document.

## 2024-12-26 ENCOUNTER — PROCEDURE VISIT (OUTPATIENT)
Dept: MATERNAL FETAL MEDICINE | Facility: CLINIC | Age: 19
End: 2024-12-26
Payer: COMMERCIAL

## 2024-12-26 ENCOUNTER — OFFICE VISIT (OUTPATIENT)
Dept: MATERNAL FETAL MEDICINE | Facility: CLINIC | Age: 19
End: 2024-12-26
Payer: COMMERCIAL

## 2024-12-26 VITALS
HEART RATE: 100 BPM | SYSTOLIC BLOOD PRESSURE: 128 MMHG | DIASTOLIC BLOOD PRESSURE: 102 MMHG | WEIGHT: 191 LBS | BODY MASS INDEX: 38.51 KG/M2 | HEIGHT: 59 IN

## 2024-12-26 DIAGNOSIS — O09.899 TWO VESSEL UMBILICAL CORD IN SINGLETON PREGNANCY, ANTEPARTUM: ICD-10-CM

## 2024-12-26 DIAGNOSIS — O99.322 DRUG USE AFFECTING PREGNANCY IN SECOND TRIMESTER: ICD-10-CM

## 2024-12-26 DIAGNOSIS — O99.213 SEVERE OBESITY DUE TO EXCESS CALORIES AFFECTING PREGNANCY IN THIRD TRIMESTER: ICD-10-CM

## 2024-12-26 DIAGNOSIS — O36.5931 LIGHT FOR GESTATIONAL DATES AFFECTING MANAGEMENT OF MOTHER, THIRD TRIMESTER, FETUS 1: ICD-10-CM

## 2024-12-26 DIAGNOSIS — O13.3 GESTATIONAL HYPERTENSION, THIRD TRIMESTER: ICD-10-CM

## 2024-12-26 DIAGNOSIS — Z86.59 HISTORY OF DEPRESSION: Primary | ICD-10-CM

## 2024-12-26 DIAGNOSIS — O36.5930: ICD-10-CM

## 2024-12-26 DIAGNOSIS — E66.01 SEVERE OBESITY DUE TO EXCESS CALORIES AFFECTING PREGNANCY IN THIRD TRIMESTER: ICD-10-CM

## 2024-12-26 DIAGNOSIS — O16.9 ELEVATED BLOOD PRESSURE AFFECTING PREGNANCY, ANTEPARTUM: ICD-10-CM

## 2024-12-26 NOTE — PROGRESS NOTES
Maternal Fetal Medicine Follow Up    Subjective:     Patient ID: [id number]    Chief Complaint: [facility] follow up w/us       HPI: Christofer Woodall is a 19 y.o. female  at 35w1d gestation with Estimated Date of Delivery: 25  who is here for follow-up consultation by M.    She has two-vessel umbilical cord.  She declined amniocentesis and had negative cell free DNA.  She used marijuana early in pregnancy, reports quit.  She has history of depression/anxiety, bipolar; but reportedly patient feels was mostly situational and has been feeling well on no medications, following with Margot Kay routinely.  Denies any SI/HI.  She had elevated BMI of 33.33 on initial MFM consult visit.  She is on low-dose aspirin once daily for preeclampsia prophylaxis.  The fetal growth was noted to be low normal on 2024.  She had elevated blood pressure on 2024.  She was evaluated in the hospital and had normal blood pressures.  She had labs as follows P/C ratio 0.18, uric acid 5.7, , serum creatinine 0.63, AST 14, ALT 10, platelets 184 K on 2024.       Interval history since last MFM visit: None.. She denies any leaking fluid, vaginal bleeding, contractions, decreased fetal movement. Denies headaches, visual disturbances, or epigastric pain.    Pregnancy complications include:   Patient Active Problem List   Diagnosis    History of depression    Elevated BMI affecting pregnancy in third trimester    Previous marijuana use    Two vessel umbilical cord in horton pregnancy, antepartum    Excessive weight gain in pregnancy in third trimester    Gestational hypertension, third trimester    Elevated blood pressure affecting pregnancy, antepartum       No changes to medical, surgical, family, social, or obstetric history.    Medications:  Current Outpatient Medications   Medication Instructions    aspirin (ECOTRIN) 81 mg, Oral, Daily    ondansetron (ZOFRAN) 4 mg, Every 6 hours PRN    PRENATAL VITAMIN  "PLUS LOW IRON 27 mg iron- 1 mg Tab 1 tablet       Review of Systems   12 point review of systems conducted, negative except as stated in the history of present illness. See HPI for details.      Objective:     Visit Vitals  BP (!) 128/102   Pulse 100   Ht 4' 11" (1.499 m)   Wt 86.6 kg (191 lb)   LMP 2024 (Exact Date)   BMI 38.58 kg/m²        Physical Exam  Vitals and nursing note reviewed.   Constitutional:       Appearance: Normal appearance.   HENT:      Head: Normocephalic and atraumatic.   Cardiovascular:      Rate and Rhythm: Normal rate and regular rhythm.   Pulmonary:      Effort: Pulmonary effort is normal. No respiratory distress.      Breath sounds: Normal breath sounds.   Abdominal:      Palpations: Abdomen is soft.      Tenderness: There is no abdominal tenderness.   Musculoskeletal:      Right lower leg: Edema (tr) present.      Left lower leg: Edema (tr) present.   Skin:     General: Skin is warm and dry.   Neurological:      Mental Status: She is alert and oriented to person, place, and time.      Deep Tendon Reflexes: Reflexes normal.   Psychiatric:         Mood and Affect: Mood normal.         Behavior: Behavior normal.         Thought Content: Thought content normal.         Judgment: Judgment normal.         Assessment/Plan:     19 y.o.  female with IUP at 35w1d    Two-vessel umbilical cord   There is low normal and continued fetal growth with an EFW of 2306 g at the 15% and the AC at the 24% on 24  AFV is normal.  BPP 8/8 today, 2024.  Normal UAD today 2024.    She was previously offered and declined amniocentesis.  She had negative cell free DNA.  She is aware of the need for routine  evaluation.    Fetal surveillance as below.    Delivery timing as below.      Low-normal fetal growth  Fetal growth still in a low-normal range today 24.  Out of caution, umbilical artery doppler was repeated today 24 and normal.      She declined amniocentesis . She " had negative cell free DNA. She is aware of need for  evaluation.      Previous marijuana use   Previously discussed risks.  Reviewed importance of complete abstinence from drug use in pregnancy. I would recommend doing intermittent drug screens to help with compliance, with risks and benefits of drug testing discussed.      History of depression/anxiety  With no symptoms at this time, it was agreed to stay off medication at this time with patient to report symptoms of depression recurrence. If she experiences any SI/HI tendencies, she is to report it immediately to provider/ER for prompt intervention. She was advised to continue follow up care with her Mental Health provider.    After delivery, she should have a follow-up appointment within 1-2 weeks with both her psychiatric provider as well as an early postpartum visit for a mood check.  It would be reasonable to have a discussion about the benefits of breast feeding versus the potential risks of medication exposure in the breast milk.       Preeclampsia prophylaxis  With her risk factors for preeclampsia, she will continue asa 81 mg daily until delivery. Preeclampsia precautions reviewed.       Increased BMI at 33 at initial MFM consult with excessive weight gain  Body mass index is 38.58 kg/m². With  2 lb gain since last visit, she was advised to continue healthy and low caloric diet.  Excess weight gain would be associated with gestational hypertension, gestational diabetes and adverse  outcomes, including fetal demise in utero.    Reviewed importance of FKC 3/day and prn with instructions to immediately report any decreased fetal movement.    It is important to lose weight after the pregnancy is over, especially before a future pregnancy. Breastfeeding may be an important tool in reducing the postpartum weight retention. Fetal risks were discussed with short term risk of fetal/ obesity and long term risk of adolescent component of  metabolic syndrome.      Gestational hypertension  Discussed risks with hypertension in pregnancy, including FGR, abruption and preeclampsia/eclampsia. Advised of low sodium diet avoiding any excessive weight gain. Ordered 24 hour urine, CBC, uric acid, LDH and LFT.     Vitals:    12/26/24 1301 12/26/24 1405   BP: (!) 129/90 (!) 128/102     She is asymptomatic.      We will send the patient for overnight observation where she could have monitoring of fetal heart rate, preeclampsia labs, get a course of steroids, and discharge tomorrow if mild gestational hypertension is confirmed.  I would recommend delivery at 36 weeks' gestation as optimal balance between prematurity risks and risk of continued pregnancy in this setting of gestational hypertension, two-vessel cord and decreased fetal growth with a increased BMI over 35.  Discussed with Dr. Watts who will be following patient next week early in the week and scheduled induction of labor later in the week.  Discussed with Dr. Holman on-call for Dr. Watts    If patient gets discharged,  Discussed with the patient need for twice weekly follow-up with at least weekly labs, with more frequent labs if worsening blood pressure or clinical condition.  Patient has a blood pressure machine at home and will be checking BP 2-3 times a day.  She was given instructions to come in immediately if she has decreased fetal movements, headaches, vision problems, or epigastric pain.  She is also to come in if blood pressure is at or over 160 systolic or 105 diastolic.      On 12/19/24, preeclampsia labs were reassuring.   Recommend close postpartum followup with Primary OB for close monitoring of the BP.        Follow up for Keep return appointment.     Future Appointments   Date Time Provider Department Center   1/2/2025  2:45 [facility]   1/2/2025  2[facility]45 [facility]        KAUR involvement: Patient was evaluated and  examined by Dr. Kapoor. DAY Mercer, helped in pre charting of part of note.    This note was created with the assistance of Ever[employer] voice recognition software. There may be transcription errors as a result of using this technology, however minimal. Effort has been made to ensure accuracy of transcription, but any obvious errors or omissions should be clarified with the author of the document.

## 2024-12-30 PROCEDURE — 76819 FETAL BIOPHYS PROFIL W/O NST: CPT | Mod: 26,S$GLB,, | Performed by: OBSTETRICS & GYNECOLOGY

## 2025-01-27 ENCOUNTER — TELEPHONE (OUTPATIENT)
Dept: MATERNAL FETAL MEDICINE | Facility: CLINIC | Age: 20
End: 2025-01-27
Payer: COMMERCIAL